# Patient Record
Sex: FEMALE | Race: WHITE | Employment: FULL TIME | URBAN - METROPOLITAN AREA
[De-identification: names, ages, dates, MRNs, and addresses within clinical notes are randomized per-mention and may not be internally consistent; named-entity substitution may affect disease eponyms.]

---

## 2018-09-06 ENCOUNTER — HOSPITAL ENCOUNTER (OUTPATIENT)
Age: 33
Discharge: HOME | End: 2018-09-06
Attending: EMERGENCY MEDICINE
Payer: COMMERCIAL

## 2018-09-06 VITALS
WEIGHT: 147 LBS | HEIGHT: 69 IN | BODY MASS INDEX: 21.77 KG/M2 | OXYGEN SATURATION: 100 % | DIASTOLIC BLOOD PRESSURE: 56 MMHG | TEMPERATURE: 98 F | HEART RATE: 73 BPM | SYSTOLIC BLOOD PRESSURE: 111 MMHG | RESPIRATION RATE: 18 BRPM

## 2018-09-06 DIAGNOSIS — J06.9 VIRAL UPPER RESPIRATORY TRACT INFECTION: ICD-10-CM

## 2018-09-06 DIAGNOSIS — J02.0 STREP PHARYNGITIS: Primary | ICD-10-CM

## 2018-09-06 LAB — S PYO AG THROAT QL: POSITIVE

## 2018-09-06 PROCEDURE — 87430 STREP A AG IA: CPT

## 2018-09-06 PROCEDURE — 99203 OFFICE O/P NEW LOW 30 MIN: CPT

## 2018-09-06 RX ORDER — PENICILLIN V POTASSIUM 500 MG/1
500 TABLET ORAL 3 TIMES DAILY
Qty: 30 TABLET | Refills: 0 | Status: SHIPPED | OUTPATIENT
Start: 2018-09-06 | End: 2018-09-16

## 2018-09-06 NOTE — ED PROVIDER NOTES
Patient Seen in: [id number]    History   Patient presents with:  Ear Pain    Stated Complaint: cold    HPI    The patient states that approximately 8 days ago she began having a cough and what she describes as chest conge adenopathy. Cardiovascular: Normal rate and regular rhythm. No murmur heard. Pulmonary/Chest: Effort normal. There is normal air entry. No respiratory distress. Without wheezes, rhonchi, or rails  Musculoskeletal: Normal range of motion.  No edema or

## 2018-09-06 NOTE — ED INITIAL ASSESSMENT (HPI)
PATIENT ARRIVED AMBULATORY TO ROOM C/O SYMPTOMS THAT STARTED 1 WEEK AGO. PATIENT CONCERNED FOR SINUS INFECTION. +NASAL CONGESTION. +CONGESTED COUGH. NO N/V/D. EASY NON LABORED RESPIRATIONS.